# Patient Record
Sex: FEMALE | NOT HISPANIC OR LATINO
[De-identification: names, ages, dates, MRNs, and addresses within clinical notes are randomized per-mention and may not be internally consistent; named-entity substitution may affect disease eponyms.]

---

## 2019-02-17 ENCOUNTER — TRANSCRIPTION ENCOUNTER (OUTPATIENT)
Age: 34
End: 2019-02-17

## 2019-03-23 ENCOUNTER — TRANSCRIPTION ENCOUNTER (OUTPATIENT)
Age: 34
End: 2019-03-23

## 2022-10-10 ENCOUNTER — APPOINTMENT (OUTPATIENT)
Dept: OTOLARYNGOLOGY | Facility: CLINIC | Age: 37
End: 2022-10-10

## 2022-10-10 VITALS
WEIGHT: 115 LBS | HEIGHT: 64 IN | HEART RATE: 81 BPM | SYSTOLIC BLOOD PRESSURE: 114 MMHG | DIASTOLIC BLOOD PRESSURE: 80 MMHG | TEMPERATURE: 97.6 F | BODY MASS INDEX: 19.63 KG/M2

## 2022-10-10 DIAGNOSIS — Z78.9 OTHER SPECIFIED HEALTH STATUS: ICD-10-CM

## 2022-10-10 DIAGNOSIS — Z83.3 FAMILY HISTORY OF DIABETES MELLITUS: ICD-10-CM

## 2022-10-10 DIAGNOSIS — R42 DIZZINESS AND GIDDINESS: ICD-10-CM

## 2022-10-10 DIAGNOSIS — Z86.2 PERSONAL HISTORY OF DISEASES OF THE BLOOD AND BLOOD-FORMING ORGANS AND CERTAIN DISORDERS INVOLVING THE IMMUNE MECHANISM: ICD-10-CM

## 2022-10-10 DIAGNOSIS — H81.10 BENIGN PAROXYSMAL VERTIGO, UNSPECIFIED EAR: ICD-10-CM

## 2022-10-10 PROBLEM — Z00.00 ENCOUNTER FOR PREVENTIVE HEALTH EXAMINATION: Status: ACTIVE | Noted: 2022-10-10

## 2022-10-10 PROCEDURE — 99203 OFFICE O/P NEW LOW 30 MIN: CPT

## 2022-10-10 NOTE — PHYSICAL EXAM
[Midline] : trachea located in midline position [Normal] : no rashes [] : [place]-Hallpike test is negative

## 2022-10-10 NOTE — HISTORY OF PRESENT ILLNESS
[de-identified] : 10/10/22\par 37F presents with 1 week of dizziness that lasted for 4 days with associated nausea and vomiting. This started when she got up from her chair turning to the right. She describes the dizziness as room spinning with associated imbalance as well. This occurred while she was being treated for a UTI with antibiotics. This never happened in the past. She does report bilateral tinnitus during this episodes. No falls or LOC. She did try the half somersault at home which helped her symptoms. No episodes since. Denies hearing loss, otalgia, or otorrhea. \par

## 2022-10-10 NOTE — ASSESSMENT
[FreeTextEntry1] : 37-year-old female presents with dizziness that started 1 week ago and resolved after she performed the half somersault maneuver at home.  Her exam is normal today.  [place]-Hallpike was negative bilaterally.  Based on her history and exam findings, I do believe her symptoms are related to right-sided versus bilateral benign positional paroxysmal vertigo (BPPV).  At this time, we have provided and reviewed our BPPV handout.  Patient will follow-up with us as needed, sooner if symptoms persist or worsen.\par \par - BPPV handout reviewed and provided \par - fu as needed, sooner if symptoms persist or worsen.